# Patient Record
Sex: MALE | Race: WHITE
[De-identification: names, ages, dates, MRNs, and addresses within clinical notes are randomized per-mention and may not be internally consistent; named-entity substitution may affect disease eponyms.]

---

## 2017-03-01 ENCOUNTER — HOSPITAL ENCOUNTER (EMERGENCY)
Dept: HOSPITAL 5 - ED | Age: 32
Discharge: HOME | End: 2017-03-01
Payer: OTHER GOVERNMENT

## 2017-03-01 VITALS — DIASTOLIC BLOOD PRESSURE: 79 MMHG | SYSTOLIC BLOOD PRESSURE: 129 MMHG

## 2017-03-01 DIAGNOSIS — L23.9: Primary | ICD-10-CM

## 2017-03-01 PROCEDURE — 99282 EMERGENCY DEPT VISIT SF MDM: CPT

## 2017-03-01 NOTE — EMERGENCY DEPARTMENT REPORT
ED Rash HPI





- HPI


Chief Complaint: Skin Rash


Stated Complaint: SKIN IRRITATION


Time Seen by Provider: 03/01/17 21:23


Duration: Today


Location: Chest, Abdomen, Upper Extremities, Lower Extremities


Suspected Cause: Medication, Plant


Rash Symptoms: Yes Itching, No Facial Swelling, No Tongue/Oral Swelling, No 

Breathing Difficulties, No Choking Sensation, No Wheezing/Dyspnea, No Peeling, 

No Blistering, No Fever, No Lightheaded, No Malaise, No Myalgias


Severity: moderate


Other History: 31-year-old male past medical history acne presents with 

complaint of hives to arms legs trunk and back since this afternoon.  Patient 

states that [place], had been handling dirty uniforms and 

subsequently developed rash on his arms trunk and back.  Patient denies any 

contact with specific allergens, no new cosmetics no new soaps, states he is on 

minocycline for his acne which she started over a week ago.  States that 

uniforms may have come in contact with bushes and shrubs in an open field.  

Patient denies any shortness of breath no throat discomfort states that rash is 

itchy is patchy and slightly raised.  Has not taken anything for this reaction





ED Review of Systems


ROS: 


Stated complaint: SKIN IRRITATION


Other details as noted in HPI





Constitutional: denies: chills, fever


Eyes: denies: eye pain, eye discharge, vision change


ENT: denies: ear pain, throat pain


Respiratory: denies: cough, shortness of breath, wheezing


Cardiovascular: denies: chest pain, palpitations


Endocrine: no symptoms reported


Gastrointestinal: denies: abdominal pain, nausea, diarrhea


Genitourinary: denies: urgency, dysuria


Musculoskeletal: denies: back pain, joint swelling, arthralgia


Skin: rash, pruritus.  denies: lesions


Neurological: denies: headache, weakness, paresthesias


Psychiatric: denies: anxiety, depression


Hematological/Lymphatic: denies: easy bleeding, easy bruising





ED Past Medical Hx





- Past Medical History


Previous Medical History?: No





- Surgical History


Past Surgical History?: No





- Social History


Smoking Status: Never Smoker


Substance Use Type: None





- Medications


Home Medications: 


 Home Medications











 Medication  Instructions  Recorded  Confirmed  Last Taken  Type


 


Calamine 237 ml TP TID PRN #1 lotion 03/01/17  Unknown Rx


 


Famotidine [Pepcid] 20 mg PO BID PRN #30 tablet 03/01/17  Unknown Rx


 


Hydrocortisone 1% [Hydrocortisone 1 applicatio TP TID PRN #1 tube 03/01/17  

Unknown Rx





1% CREAM]     


 


Hydroxyzine HCl 25 mg PO Q8H PRN #30 tablet 03/01/17  Unknown Rx














Rash Exam





- Exam


General: 


Vital signs noted. No distress. Alert and acting appropriately.





HEENT: No Periorbital Edema, No Conjuctival Injection, No Chemosis, No Perioral 

Edema, No Tongue Edema, No Uvular Edema, No Compromised Airway, No Drooling


Lungs: Yes Good Air Exchange (Normal Breath Sounds), No Wheezes, No Ronchi, No 

Stridor, No Cough, No Labored Respirations, No Retractions, No Use of Accessory 

Muscles, No Other Abnormal Lung Sounds


Heart: Yes Regular, No Murmur


Skin: Yes Urticarial Rash (patient has diffuse hives and arms legs trunk and 

back)


Other: Positive: Abdomen Normal, Neurologic Normal, Musculoskeletal Normal





ED Course


 Vital Signs











  03/01/17





  21:01


 


Temperature 98.0 F


 


Pulse Rate 83


 


Respiratory 20





Rate 


 


Blood Pressure 129/79


 


O2 Sat by Pulse 100





Oximetry 














ED Medical Decision Making





- Medical Decision Making


A/P: Allergic reaction, hives, allergic dermatitis


1-by mouth hydroxyzine and Pepcid when necessary for hives and itching.  

Patient has had significant resolution of itching and rash with only one dose 

of these medicines in the ED


2-topical hydrocortisone and calamine lotion to affected areas of skin


3-patient has no signs of angioedema or anaphylaxis, oropharynx completely 

patent no respiratory distress whatsoever


4-I advised patient to wait until hives resolve before he restarts using 

minocycline for his acne.  I advised patient that if hives resolve and he 

restarts minocycline and it produces hives again that this is likely the 

culprit of his allergic reaction


5- patient referred to primary care for follow-up, patient already has 

dermatologist to follow-up with


Critical care attestation.: 


If time is entered above; I have spent that time in minutes in the direct care 

of this critically ill patient, excluding procedure time.








ED Disposition


Clinical Impression: 


 Allergic dermatitis





Allergic reaction


Qualifiers:


 Encounter type: initial encounter Qualified Code(s): T78.40XA - Allergy, 

unspecified, initial encounter





Disposition: DISCHARGED TO HOME OR SELFCARE


Is pt being admited?: No


Does the pt Need Aspirin: No


Condition: Stable


Instructions:  Contact Dermatitis (ED), Urticaria (ED), Allergies (ED)


Prescriptions: 


Calamine 237 ml TP TID PRN #1 lotion


 PRN Reason: Itching


Famotidine [Pepcid] 20 mg PO BID PRN #30 tablet


 PRN Reason: Allergic Reaction


Hydrocortisone 1% [Hydrocortisone 1% CREAM] 1 applicatio TP TID PRN #1 tube


 PRN Reason: Itching


Hydroxyzine HCl 25 mg PO Q8H PRN #30 tablet


 PRN Reason: Itching


Referrals: 


ELVIRA PHILLIPS MD [Staff Physician] - 3-5 Days


Forms:  Work/School Release Form(ED)


Time of Disposition: 22:42

## 2021-07-29 ENCOUNTER — WEB ENCOUNTER (OUTPATIENT)
Dept: URBAN - METROPOLITAN AREA CLINIC 12 | Facility: CLINIC | Age: 36
End: 2021-07-29

## 2021-07-29 ENCOUNTER — DASHBOARD ENCOUNTERS (OUTPATIENT)
Age: 36
End: 2021-07-29

## 2021-07-29 ENCOUNTER — OFFICE VISIT (OUTPATIENT)
Dept: URBAN - METROPOLITAN AREA CLINIC 12 | Facility: CLINIC | Age: 36
End: 2021-07-29
Payer: OTHER GOVERNMENT

## 2021-07-29 VITALS
WEIGHT: 191.6 LBS | TEMPERATURE: 96.9 F | HEIGHT: 78 IN | SYSTOLIC BLOOD PRESSURE: 134 MMHG | HEART RATE: 108 BPM | BODY MASS INDEX: 22.17 KG/M2 | DIASTOLIC BLOOD PRESSURE: 87 MMHG

## 2021-07-29 DIAGNOSIS — R12 HEARTBURN: ICD-10-CM

## 2021-07-29 DIAGNOSIS — R14.2 ERUCTATION: ICD-10-CM

## 2021-07-29 DIAGNOSIS — R10.9 ABDOMINAL CRAMPING: ICD-10-CM

## 2021-07-29 DIAGNOSIS — R14.0 BLOATING: ICD-10-CM

## 2021-07-29 PROCEDURE — 99203 OFFICE O/P NEW LOW 30 MIN: CPT | Performed by: STUDENT IN AN ORGANIZED HEALTH CARE EDUCATION/TRAINING PROGRAM

## 2021-07-29 RX ORDER — ESCITALOPRAM OXALATE 5 MG/1
1 TABLET TABLET, FILM COATED ORAL ONCE A DAY
Status: ACTIVE | COMMUNITY

## 2021-07-29 RX ORDER — DICYCLOMINE HYDROCHLORIDE 10 MG/1
1 TABLET CAPSULE ORAL THREE TIMES A DAY
Qty: 270 TABLET | Refills: 3 | OUTPATIENT
Start: 2021-07-29 | End: 2022-07-24

## 2021-07-29 RX ORDER — OMEPRAZOLE 40 MG/1
1 CAPSULE 30 MINUTES BEFORE MORNING MEAL CAPSULE, DELAYED RELEASE ORAL ONCE A DAY
Status: ACTIVE | COMMUNITY

## 2021-07-29 RX ORDER — GABAPENTIN 600 MG/1
1 TABLET TABLET, FILM COATED ORAL ONCE A DAY
Status: ACTIVE | COMMUNITY

## 2021-07-29 NOTE — HPI-TODAY'S VISIT:
37 yo M here for evaluation of gas and bloating.  Symptoms present for many years, but worse over the past few weeks. Denies significant abominal pain or discomfort, but says he wants to make sure there's no issues.  He states he can hear his food going through his intestines, moreso in the morning.  Reports having lots of burping, and upper GI gas; sometimes worsened from specific fruits/vegetables (cherries) States he wakes up in the morning and feels like his throat is sore from reflux. He has tried taking omeprazole 40mg daily, which helps with his heartburn.  Says that sometimes he gets a shooting pain to his right lower abdomen but lasts a second and then goes away. Denies fever/chills He had a CT scan done for a back injury and was told he had a hiatal hernia.  Recent labs from PCP were reviewed. Normal CBC and CMP. H pylori test was negative. Celiac not checked

## 2021-08-03 LAB — TTG/DGP SCREEN: NEGATIVE

## 2024-07-24 ENCOUNTER — OFFICE VISIT (OUTPATIENT)
Dept: URBAN - METROPOLITAN AREA CLINIC 12 | Facility: CLINIC | Age: 39
End: 2024-07-24

## 2024-07-24 ENCOUNTER — TELEPHONE ENCOUNTER (OUTPATIENT)
Dept: URBAN - METROPOLITAN AREA CLINIC 23 | Facility: CLINIC | Age: 39
End: 2024-07-24

## 2024-07-24 VITALS
SYSTOLIC BLOOD PRESSURE: 154 MMHG | DIASTOLIC BLOOD PRESSURE: 98 MMHG | HEART RATE: 114 BPM | HEIGHT: 78 IN | BODY MASS INDEX: 22.01 KG/M2 | TEMPERATURE: 97.3 F | WEIGHT: 190.2 LBS

## 2024-07-24 PROBLEM — 66187002: Status: ACTIVE | Noted: 2024-07-24

## 2024-07-24 RX ORDER — ESCITALOPRAM OXALATE 5 MG/1
1 TABLET TABLET, FILM COATED ORAL ONCE A DAY
Status: ACTIVE | COMMUNITY

## 2024-07-24 RX ORDER — PREDNISONE 20 MG/1
2 TABLETS 13 HOURS, 7 HOURS, AND 1 HOUR BEFORE CONTRAST MEDIA INJECTION TABLET ORAL ONCE A DAY
Status: ACTIVE | COMMUNITY

## 2024-07-24 RX ORDER — GABAPENTIN 600 MG/1
1 TABLET TABLET, FILM COATED ORAL ONCE A DAY
Status: ACTIVE | COMMUNITY

## 2024-07-24 RX ORDER — OMEPRAZOLE 40 MG/1
1 CAPSULE 30 MINUTES BEFORE MORNING MEAL CAPSULE, DELAYED RELEASE ORAL ONCE A DAY
Status: ACTIVE | COMMUNITY

## 2024-07-24 NOTE — PHYSICAL EXAM HENT:
normocephalic , atraumatic , External nose  normal appearance Initiate Treatment: Apply sunscreen SPF >30 to sun exposed areas daily Detail Level: Zone

## 2024-07-24 NOTE — HPI-TODAY'S VISIT:
Went to [place] and returned 2-3 weeks ago. His whole group had similar Sx  Says stool test came back negative but he had fat in the stool. Says O&P was negative but had fat in stool. Now he feels better, diarrhea has improved.  Now having normal stool

## 2024-07-31 LAB
COMMENT: (no result)
Lab: (no result)
MESSAGE:: (no result)
TEST ORDERED ON REQ:: (no result)

## 2024-08-02 ENCOUNTER — TELEPHONE ENCOUNTER (OUTPATIENT)
Dept: URBAN - METROPOLITAN AREA CLINIC 12 | Facility: CLINIC | Age: 39
End: 2024-08-02

## 2024-08-11 ENCOUNTER — WEB ENCOUNTER (OUTPATIENT)
Dept: URBAN - METROPOLITAN AREA CLINIC 12 | Facility: CLINIC | Age: 39
End: 2024-08-11